# Patient Record
Sex: FEMALE | Employment: STUDENT | URBAN - METROPOLITAN AREA
[De-identification: names, ages, dates, MRNs, and addresses within clinical notes are randomized per-mention and may not be internally consistent; named-entity substitution may affect disease eponyms.]

---

## 2018-08-19 ENCOUNTER — OFFICE VISIT (OUTPATIENT)
Dept: URGENT CARE | Facility: URGENT CARE | Age: 28
End: 2018-08-19

## 2018-08-19 VITALS
DIASTOLIC BLOOD PRESSURE: 66 MMHG | WEIGHT: 134.2 LBS | TEMPERATURE: 97.9 F | SYSTOLIC BLOOD PRESSURE: 108 MMHG | HEART RATE: 53 BPM | OXYGEN SATURATION: 100 %

## 2018-08-19 DIAGNOSIS — K02.9 DENTAL CARIES: Primary | ICD-10-CM

## 2018-08-19 DIAGNOSIS — K08.89 TOOTH PAIN: ICD-10-CM

## 2018-08-19 PROCEDURE — 99203 OFFICE O/P NEW LOW 30 MIN: CPT | Performed by: FAMILY MEDICINE

## 2018-08-19 RX ORDER — PENICILLIN V POTASSIUM 500 MG/1
500 TABLET, FILM COATED ORAL 3 TIMES DAILY
Qty: 21 TABLET | Refills: 0 | Status: SHIPPED | OUTPATIENT
Start: 2018-08-19 | End: 2018-08-26

## 2018-08-19 NOTE — PROGRESS NOTES
SUBJECTIVE:   Georgina Sigala is a 28 year old female presenting with a chief complaint of severe pain, swelling at the right upper tooth (a molar), headache (right temple, right forehead, right posterior head, right posterior neck), right eye pressure.  Patient would like an antibiotic. Patient has a dental appointment scheduled for August 23, 2018.    Onset of symptoms was two days ago.  Course of illness is worsening..    Severity severe pain.   Current and Associated symptoms: as listed above.   Treatment measures tried include Motrin without any relief. .  Predisposing factors include none. .    Past Medical History:    Systemic Lupus Erythematosus.      No current outpatient prescriptions on file.     Social History   Substance Use Topics     Smoking status: Never Smoker     Smokeless tobacco: Never Used     Alcohol use Not on file       ROS:  Review of systems negative except as stated above.    OBJECTIVE:  /66 (BP Location: Right arm, Patient Position: Chair, Cuff Size: Adult Regular)  Pulse 53  Temp 97.9  F (36.6  C) (Oral)  Wt 134 lb 3.2 oz (60.9 kg)  SpO2 100%  GENERAL APPEARANCE: healthy, alert and in a lot of pain.   HENT: The right upper molar has severe caries with eroded enamel and pain when that tooth is touched.    NECK: supple.  No lymphadenopathy.  There is, however, some pain with palpation over the left neck.  Oropharynx within normal limits.  There is some tenderness over the right temperomandibular joint.     ASSESSMENT:  Tooth Pain  Dental Caries    PLAN:  Rx:  Tylenol #3 and Penicillin VK  follow up with a dentist as soon as possible.   Go to the emergency room if any fevers/worsening headache appear.s    See orders in Epic    Jean Colin MD

## 2018-08-19 NOTE — MR AVS SNAPSHOT
"              After Visit Summary   2018    Georgina Sigala    MRN: [id number]           Patient Information     Date Of Birth          1990        Visit Information        Provider Department      2018 2:00 PM Jean Colin MD [facility]        Today's Diagnoses     Dental caries    -  1    Tooth pain          Care Instructions    follow up with a dentist as soon as possible    Go to the emergency room if any fevers or worsening headache appears.            Follow-ups after your visit        Who to contact     If you have questions or need follow up information about today's clinic visit or your schedule please contact [facility] directly at [phone].  Normal or non-critical lab and imaging results will be communicated to you by MyChart, letter or phone within 4 business days after the clinic has received the results. If you do not hear from us within 7 days, please contact the clinic through MyChart or phone. If you have a critical or abnormal lab result, we will notify you by phone as soon as possible.  Submit refill requests through De[employer] or call your pharmacy and they will forward the refill request to us. Please allow 3 business days for your refill to be completed.          Additional Information About Your Visit        MyChart Information     DeRev lets you send messages to your doctor, view your test results, renew your prescriptions, schedule appointments and more. To sign up, go to [email or web address] . Click on \"Log in\" on the left side of the screen, which will take you to the Welcome page. Then click on \"Sign up Now\" on the right side of the page.     You will be asked to enter the access code listed below, as well as some personal information. Please follow the directions to create your username and password.     Your access code is: [email or web address]  Expires: 2018  3:50 PM     Your access code will  in 90 days. If you need help or a " new code, please call your [facility] or [phone].        Care EveryWhere ID     This is your Care EveryWhere ID. This could be used by other organizations to access your [place] medical records  [id number]        Your Vitals Were     Pulse Temperature Pulse Oximetry             53 97.9  F (36.6  C) (Oral) 100%          Blood Pressure from Last 3 Encounters:   08/19/18 108/66    Weight from Last 3 Encounters:   08/19/18 134 lb 3.2 oz (60.9 kg)              Today, you had the following     No orders found for display         Today's Medication Changes          These changes are accurate as of 8/19/18  3:50 PM.  If you have any questions, ask your nurse or doctor.               Start taking these medicines.        Dose/Directions    acetaminophen-codeine 300-30 MG per tablet   Commonly known as:  TYLENOL #3   Used for:  Tooth pain   Started by:  Jean Colin MD        Dose:  1-2 tablet   Take 1-2 tablets by mouth every 6 hours as needed for severe pain   Quantity:  20 tablet   Refills:  0       penicillin V potassium 500 MG tablet   Commonly known as:  VEETID   Used for:  Dental caries, Tooth pain   Started by:  Jean Colin MD        Dose:  500 mg   Take 1 tablet (500 mg) by mouth 3 times daily for 7 days   Quantity:  21 tablet   Refills:  0            Where to get your medicines      Some of these will need a paper prescription and others can be bought over the counter.  Ask your nurse if you have questions.     Bring a paper prescription for each of these medications     acetaminophen-codeine 300-30 MG per tablet    penicillin V potassium 500 MG tablet               Information about OPIOIDS     PRESCRIPTION OPIOIDS: WHAT YOU NEED TO KNOW   We gave you an opioid (narcotic) pain medicine. It is important to manage your pain, but opioids are not always the best choice. You should first try all the other options your care team gave you. Take this medicine for as short a time (and as few doses) as  possible.    Some activities can increase your pain, such as bandage changes or therapy sessions. It may help to take your pain medicine 30 to 60 minutes before these activities. Reduce your stress by getting enough sleep, working on hobbies you enjoy and practicing relaxation or meditation. Talk to your care team about ways to manage your pain beyond prescription opioids.    These medicines have risks:    DO NOT drive when on new or higher doses of pain medicine. These medicines can affect your alertness and reaction times, and you could be arrested for driving under the influence (DUI). If you need to use opioids long-term, talk to your care team about driving.    DO NOT operate heavy machinery    DO NOT do any other dangerous activities while taking these medicines.    DO NOT drink any alcohol while taking these medicines.     If the opioid prescribed includes acetaminophen, DO NOT take with any other medicines that contain acetaminophen. Read all labels carefully. Look for the word  acetaminophen  or  Tylenol.  Ask your pharmacist if you have questions or are unsure.    You can get addicted to pain medicines, especially if you have a history of addiction (chemical, alcohol or substance dependence). Talk to your care team about ways to reduce this risk.    All opioids tend to cause constipation. Drink plenty of water and eat foods that have a lot of fiber, such as fruits, vegetables, prune juice, apple juice and high-fiber cereal. Take a laxative (Miralax, milk of magnesia, Colace, Senna) if you don t move your bowels at least every other day. Other side effects include upset stomach, sleepiness, dizziness, throwing up, tolerance (needing more of the medicine to have the same effect), physical dependence and slowed breathing.    Store your pills in a secure place, locked if possible. We will not replace any lost or stolen medicine. If you don t finish your medicine, please throw away (dispose) as directed by your  pharmacist. The [place] [employer]ution [employer] has more information about safe disposal: https://www.pca.[email or web address].[place].us/living-green/managing-unwanted-medications         Primary Care Provider Fax #    Physician No Ref-Primary [phone]       No address on file        Equal Access to Services     MORGANMELA CHARISSE : Tiana chandler aguilera joseo Somarychuyali, waaxda luqadaha, qaybta kaalmada adeegyada, evie oliverosgracy ninostefani meehan la'erikagracy riuz. So [facility] [phone].    ATENCIÓN: Si habla español, tiene a wilkinson disposición servicios gratuitos de asistencia lingüística. Changame al [phone].    We comply with applicable federal civil rights laws and [place] laws. We do not discriminate on the basis of race, color, national origin, age, disability, sex, sexual orientation, or gender identity.            Thank you!     Thank you for choosing [facility]  for your care. Our goal is always to provide you with excellent care. Hearing back from our patients is one way we can continue to improve our services. Please take a few minutes to complete the written survey that you may receive in the mail after your visit with us. Thank you!             Your Updated Medication List - Protect others around you: Learn how to safely use, store and throw away your medicines at www.disposemymeds.org.          This list is accurate as of 8/19/18  3:50 PM.  Always use your most recent med list.                   Brand Name Dispense Instructions for use Diagnosis    acetaminophen-codeine 300-30 MG per tablet    TYLENOL #3    20 tablet    Take 1-2 tablets by mouth every 6 hours as needed for severe pain    Tooth pain       penicillin V potassium 500 MG tablet    VEETID    21 tablet    Take 1 tablet (500 mg) by mouth 3 times daily for 7 days    Dental caries, Tooth pain

## 2018-08-19 NOTE — PATIENT INSTRUCTIONS
follow up with a dentist as soon as possible    Go to the emergency room if any fevers or worsening headache appears.